# Patient Record
Sex: MALE | NOT HISPANIC OR LATINO | Employment: FULL TIME | URBAN - NONMETROPOLITAN AREA
[De-identification: names, ages, dates, MRNs, and addresses within clinical notes are randomized per-mention and may not be internally consistent; named-entity substitution may affect disease eponyms.]

---

## 2023-12-14 ENCOUNTER — TELEPHONE (OUTPATIENT)
Dept: CARDIOLOGY | Facility: HOSPITAL | Age: 58
End: 2023-12-14
Payer: COMMERCIAL

## 2023-12-14 DIAGNOSIS — D64.9 ANEMIA, UNSPECIFIED TYPE: ICD-10-CM

## 2023-12-14 DIAGNOSIS — E78.5 DYSLIPIDEMIA: ICD-10-CM

## 2023-12-14 DIAGNOSIS — R06.02 SHORTNESS OF BREATH: Primary | ICD-10-CM

## 2023-12-14 DIAGNOSIS — I25.85 CHRONIC CORONARY MICROVASCULAR DYSFUNCTION: ICD-10-CM

## 2023-12-14 DIAGNOSIS — I27.20 PULMONARY HYPERTENSION (MULTI): ICD-10-CM

## 2023-12-21 DIAGNOSIS — R42 DIZZINESS: ICD-10-CM

## 2023-12-21 DIAGNOSIS — I50.9 CONGESTIVE HEART FAILURE, UNSPECIFIED HF CHRONICITY, UNSPECIFIED HEART FAILURE TYPE (MULTI): ICD-10-CM

## 2023-12-21 DIAGNOSIS — Z00.00 HEALTHCARE MAINTENANCE: Primary | ICD-10-CM

## 2023-12-21 DIAGNOSIS — R06.02 SHORTNESS OF BREATH: Primary | ICD-10-CM

## 2023-12-21 DIAGNOSIS — R55 SYNCOPE, UNSPECIFIED SYNCOPE TYPE: ICD-10-CM

## 2023-12-22 ENCOUNTER — HOSPITAL ENCOUNTER (OUTPATIENT)
Dept: RESPIRATORY THERAPY | Facility: HOSPITAL | Age: 58
Discharge: HOME | End: 2023-12-22
Payer: COMMERCIAL

## 2023-12-22 ENCOUNTER — HOSPITAL ENCOUNTER (OUTPATIENT)
Dept: CARDIOLOGY | Facility: HOSPITAL | Age: 58
Discharge: HOME | End: 2023-12-22
Payer: COMMERCIAL

## 2023-12-22 ENCOUNTER — LAB (OUTPATIENT)
Dept: LAB | Facility: LAB | Age: 58
End: 2023-12-22
Payer: COMMERCIAL

## 2023-12-22 ENCOUNTER — OFFICE VISIT (OUTPATIENT)
Dept: CARDIOLOGY | Facility: HOSPITAL | Age: 58
End: 2023-12-22
Payer: COMMERCIAL

## 2023-12-22 ENCOUNTER — HOSPITAL ENCOUNTER (OUTPATIENT)
Dept: RADIOLOGY | Facility: HOSPITAL | Age: 58
Discharge: HOME | End: 2023-12-22
Payer: COMMERCIAL

## 2023-12-22 DIAGNOSIS — I27.20 PULMONARY HYPERTENSION (MULTI): ICD-10-CM

## 2023-12-22 DIAGNOSIS — E78.5 DYSLIPIDEMIA: ICD-10-CM

## 2023-12-22 DIAGNOSIS — R06.02 SHORTNESS OF BREATH: ICD-10-CM

## 2023-12-22 DIAGNOSIS — D64.9 ANEMIA, UNSPECIFIED TYPE: ICD-10-CM

## 2023-12-22 DIAGNOSIS — Z00.00 HEALTHCARE MAINTENANCE: ICD-10-CM

## 2023-12-22 DIAGNOSIS — I10 PRIMARY HYPERTENSION: ICD-10-CM

## 2023-12-22 DIAGNOSIS — I25.85 CHRONIC CORONARY MICROVASCULAR DYSFUNCTION: ICD-10-CM

## 2023-12-22 DIAGNOSIS — M89.8X9 BONE PAIN: Primary | ICD-10-CM

## 2023-12-22 DIAGNOSIS — E11.9 TYPE 2 DIABETES MELLITUS (MULTI): Primary | ICD-10-CM

## 2023-12-22 DIAGNOSIS — M89.8X9 BONE PAIN: ICD-10-CM

## 2023-12-22 DIAGNOSIS — E11.9 TYPE 2 DIABETES MELLITUS (MULTI): ICD-10-CM

## 2023-12-22 DIAGNOSIS — Z98.61 S/P PTCA (PERCUTANEOUS TRANSLUMINAL CORONARY ANGIOPLASTY): ICD-10-CM

## 2023-12-22 DIAGNOSIS — I25.2 HISTORY OF MYOCARDIAL INFARCTION: Primary | Chronic | ICD-10-CM

## 2023-12-22 DIAGNOSIS — R06.02 SOB (SHORTNESS OF BREATH): ICD-10-CM

## 2023-12-22 PROBLEM — I27.24 CTEPH (CHRONIC THROMBOEMBOLIC PULMONARY HYPERTENSION) (MULTI): Status: ACTIVE | Noted: 2023-12-22

## 2023-12-22 PROBLEM — M10.9 GOUT: Status: ACTIVE | Noted: 2023-12-22

## 2023-12-22 PROBLEM — Z86.711 HISTORY OF PULMONARY EMBOLISM: Status: ACTIVE | Noted: 2023-12-22

## 2023-12-22 PROBLEM — R94.39 ABNORMAL STRESS TEST: Status: ACTIVE | Noted: 2023-12-22

## 2023-12-22 PROBLEM — E03.9 HYPOTHYROIDISM: Status: ACTIVE | Noted: 2023-12-22

## 2023-12-22 PROBLEM — I25.10 CAD (CORONARY ARTERY DISEASE): Status: ACTIVE | Noted: 2023-12-22

## 2023-12-22 PROBLEM — E66.9 OBESITY: Status: ACTIVE | Noted: 2023-12-22

## 2023-12-22 LAB
25(OH)D3 SERPL-MCNC: 6 NG/ML (ref 30–100)
ALBUMIN SERPL BCP-MCNC: 4 G/DL (ref 3.4–5)
ALP SERPL-CCNC: 85 U/L (ref 33–120)
ALT SERPL W P-5'-P-CCNC: 10 U/L (ref 10–52)
ANION GAP SERPL CALC-SCNC: 14 MMOL/L (ref 10–20)
AORTIC VALVE PEAK VELOCITY: 1.37
AST SERPL W P-5'-P-CCNC: 17 U/L (ref 9–39)
AV PEAK GRADIENT: 7.5
AVA (PEAK VEL): 2.99
BASOPHILS # BLD AUTO: 0.09 X10*3/UL (ref 0–0.1)
BASOPHILS NFR BLD AUTO: 1.5 %
BILIRUB SERPL-MCNC: 0.5 MG/DL (ref 0–1.2)
BUN SERPL-MCNC: 10 MG/DL (ref 6–23)
C PEPTIDE SERPL-MCNC: 2.8 NG/ML (ref 0.7–3.9)
CALCIUM SERPL-MCNC: 8.7 MG/DL (ref 8.6–10.6)
CHLORIDE SERPL-SCNC: 109 MMOL/L (ref 98–107)
CHOLEST SERPL-MCNC: 115 MG/DL (ref 0–199)
CHOLESTEROL/HDL RATIO: 2.9
CO2 SERPL-SCNC: 22 MMOL/L (ref 21–32)
CREAT SERPL-MCNC: 1.33 MG/DL (ref 0.5–1.3)
EJECTION FRACTION APICAL 4 CHAMBER: 62.7
EJECTION FRACTION: 64
EOSINOPHIL # BLD AUTO: 0.44 X10*3/UL (ref 0–0.7)
EOSINOPHIL NFR BLD AUTO: 7.3 %
ERYTHROCYTE [DISTWIDTH] IN BLOOD BY AUTOMATED COUNT: 20.3 % (ref 11.5–14.5)
FERRITIN SERPL-MCNC: 11 NG/ML (ref 20–300)
GFR SERPL CREATININE-BSD FRML MDRD: 62 ML/MIN/1.73M*2
GLUCOSE SERPL-MCNC: 97 MG/DL (ref 74–99)
HCT VFR BLD AUTO: 33.1 % (ref 41–52)
HDLC SERPL-MCNC: 40.2 MG/DL
HGB BLD-MCNC: 9.5 G/DL (ref 13.5–17.5)
IMM GRANULOCYTES # BLD AUTO: 0.01 X10*3/UL (ref 0–0.7)
IMM GRANULOCYTES NFR BLD AUTO: 0.2 % (ref 0–0.9)
IRON SATN MFR SERPL: 5 % (ref 25–45)
IRON SERPL-MCNC: 20 UG/DL (ref 35–150)
LDLC SERPL CALC-MCNC: 52 MG/DL
LEFT ATRIUM VOLUME AREA LENGTH INDEX BSA: 21.9
LEFT VENTRICLE INTERNAL DIMENSION DIASTOLE: 5.29 (ref 3.5–6)
LEFT VENTRICULAR OUTFLOW TRACT DIAMETER: 2.09
LYMPHOCYTES # BLD AUTO: 1.52 X10*3/UL (ref 1.2–4.8)
LYMPHOCYTES NFR BLD AUTO: 25.3 %
MAGNESIUM SERPL-MCNC: 2.02 MG/DL (ref 1.6–2.4)
MCH RBC QN AUTO: 19.7 PG (ref 26–34)
MCHC RBC AUTO-ENTMCNC: 28.7 G/DL (ref 32–36)
MCV RBC AUTO: 69 FL (ref 80–100)
MITRAL VALVE E/A RATIO: 1.22
MONOCYTES # BLD AUTO: 0.39 X10*3/UL (ref 0.1–1)
MONOCYTES NFR BLD AUTO: 6.5 %
NEUTROPHILS # BLD AUTO: 3.55 X10*3/UL (ref 1.2–7.7)
NEUTROPHILS NFR BLD AUTO: 59.2 %
NON HDL CHOLESTEROL: 75 MG/DL (ref 0–149)
NRBC BLD-RTO: 0 /100 WBCS (ref 0–0)
PLATELET # BLD AUTO: 242 X10*3/UL (ref 150–450)
POTASSIUM SERPL-SCNC: 4.5 MMOL/L (ref 3.5–5.3)
PROT SERPL-MCNC: 6.5 G/DL (ref 6.4–8.2)
PSA SERPL-MCNC: 0.56 NG/ML
PTH-INTACT SERPL-MCNC: 171.7 PG/ML (ref 18.5–88)
RBC # BLD AUTO: 4.82 X10*6/UL (ref 4.5–5.9)
RBC MORPH BLD: NORMAL
RIGHT VENTRICLE FREE WALL PEAK S': 16
SODIUM SERPL-SCNC: 140 MMOL/L (ref 136–145)
TIBC SERPL-MCNC: 431 UG/DL (ref 240–445)
TRIGL SERPL-MCNC: 114 MG/DL (ref 0–149)
TSH SERPL-ACNC: 1.49 MIU/L (ref 0.44–3.98)
UIBC SERPL-MCNC: 411 UG/DL (ref 110–370)
VLDL: 23 MG/DL (ref 0–40)
WBC # BLD AUTO: 6 X10*3/UL (ref 4.4–11.3)

## 2023-12-22 PROCEDURE — 83970 ASSAY OF PARATHORMONE: CPT

## 2023-12-22 PROCEDURE — 80053 COMPREHEN METABOLIC PANEL: CPT

## 2023-12-22 PROCEDURE — 83550 IRON BINDING TEST: CPT

## 2023-12-22 PROCEDURE — 94726 PLETHYSMOGRAPHY LUNG VOLUMES: CPT | Performed by: STUDENT IN AN ORGANIZED HEALTH CARE EDUCATION/TRAINING PROGRAM

## 2023-12-22 PROCEDURE — 36415 COLL VENOUS BLD VENIPUNCTURE: CPT

## 2023-12-22 PROCEDURE — 93306 TTE W/DOPPLER COMPLETE: CPT

## 2023-12-22 PROCEDURE — 93010 ELECTROCARDIOGRAM REPORT: CPT | Performed by: STUDENT IN AN ORGANIZED HEALTH CARE EDUCATION/TRAINING PROGRAM

## 2023-12-22 PROCEDURE — 93306 TTE W/DOPPLER COMPLETE: CPT | Performed by: INTERNAL MEDICINE

## 2023-12-22 PROCEDURE — 93005 ELECTROCARDIOGRAM TRACING: CPT

## 2023-12-22 PROCEDURE — 83735 ASSAY OF MAGNESIUM: CPT

## 2023-12-22 PROCEDURE — 1036F TOBACCO NON-USER: CPT | Performed by: INTERNAL MEDICINE

## 2023-12-22 PROCEDURE — 84443 ASSAY THYROID STIM HORMONE: CPT

## 2023-12-22 PROCEDURE — 84681 ASSAY OF C-PEPTIDE: CPT

## 2023-12-22 PROCEDURE — 94729 DIFFUSING CAPACITY: CPT | Performed by: STUDENT IN AN ORGANIZED HEALTH CARE EDUCATION/TRAINING PROGRAM

## 2023-12-22 PROCEDURE — 94010 BREATHING CAPACITY TEST: CPT | Performed by: STUDENT IN AN ORGANIZED HEALTH CARE EDUCATION/TRAINING PROGRAM

## 2023-12-22 PROCEDURE — 84153 ASSAY OF PSA TOTAL: CPT

## 2023-12-22 PROCEDURE — 94618 PULMONARY STRESS TESTING: CPT | Performed by: STUDENT IN AN ORGANIZED HEALTH CARE EDUCATION/TRAINING PROGRAM

## 2023-12-22 PROCEDURE — 82172 ASSAY OF APOLIPOPROTEIN: CPT

## 2023-12-22 PROCEDURE — 83540 ASSAY OF IRON: CPT

## 2023-12-22 PROCEDURE — 99215 OFFICE O/P EST HI 40 MIN: CPT | Performed by: INTERNAL MEDICINE

## 2023-12-22 PROCEDURE — 82728 ASSAY OF FERRITIN: CPT

## 2023-12-22 PROCEDURE — 82306 VITAMIN D 25 HYDROXY: CPT

## 2023-12-22 PROCEDURE — 99215 OFFICE O/P EST HI 40 MIN: CPT | Mod: 25 | Performed by: INTERNAL MEDICINE

## 2023-12-22 PROCEDURE — 80061 LIPID PANEL: CPT

## 2023-12-22 PROCEDURE — 85025 COMPLETE CBC W/AUTO DIFF WBC: CPT

## 2023-12-22 PROCEDURE — 71046 X-RAY EXAM CHEST 2 VIEWS: CPT | Performed by: RADIOLOGY

## 2023-12-22 PROCEDURE — 71046 X-RAY EXAM CHEST 2 VIEWS: CPT

## 2023-12-22 RX ORDER — ALLOPURINOL 100 MG/1
TABLET ORAL
COMMUNITY
Start: 2016-06-14

## 2023-12-22 RX ORDER — IBUPROFEN 800 MG/1
TABLET ORAL
COMMUNITY
Start: 2023-05-01 | End: 2023-12-24 | Stop reason: ALTCHOICE

## 2023-12-22 RX ORDER — COLCHICINE 0.6 MG/1
TABLET ORAL
COMMUNITY
Start: 2018-06-25

## 2023-12-22 RX ORDER — AMOXICILLIN 500 MG/1
500 TABLET, FILM COATED ORAL 3 TIMES DAILY
COMMUNITY
Start: 2023-05-01

## 2023-12-22 RX ORDER — ASPIRIN 81 MG/1
TABLET ORAL
COMMUNITY
Start: 2016-06-14

## 2023-12-22 RX ORDER — IRON POLYSACCHARIDE COMPLEX 150 MG
1 CAPSULE ORAL 2 TIMES DAILY
COMMUNITY
Start: 2022-01-22 | End: 2023-12-25 | Stop reason: SDUPTHER

## 2023-12-22 RX ORDER — LEVOTHYROXINE SODIUM 25 UG/1
1 TABLET ORAL DAILY
COMMUNITY
Start: 2016-06-14

## 2023-12-22 RX ORDER — EZETIMIBE 10 MG/1
TABLET ORAL
COMMUNITY
Start: 2016-06-14

## 2023-12-22 RX ORDER — METOPROLOL SUCCINATE 50 MG/1
TABLET, EXTENDED RELEASE ORAL
COMMUNITY
Start: 2016-06-14

## 2023-12-22 RX ORDER — ATORVASTATIN CALCIUM 80 MG/1
TABLET, FILM COATED ORAL
COMMUNITY
Start: 2016-06-14

## 2023-12-22 RX ORDER — TADALAFIL 20 MG/1
2 TABLET ORAL DAILY
COMMUNITY
Start: 2016-06-14

## 2023-12-22 NOTE — PROGRESS NOTES
DIAGNOSES: Pulmonary arterial hypertension. Symptomatically improving with anticoagulation therapy, H/o PE. Mildly dilated right ventricle with mild RV systolic dysfunction (2012) -> Normal RV sized and function (12/22/2023). 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 Preserved LV systolic function. (Cath 9/13/2012) NYHA class II. sinus rhythm. Euvolemic. Hypertension. Dyslipidemia. Type 2 Diabetes Mellitus (LhP7m=3.4 on 12/22/2023). Hypothyroidism on replacement therapy. Fe deficiency anemia.     Mr Hinton has come today for routine follow up. He continues to remain active on current medications, including DOAC and PDE5i.   [place] and hence he requires medications for 6 months supply. He was found to be anemic. He has not had routine colonoscopy done.      12 system review was otherwise negative.     His current medications include Metoprolol ER 25 mg daily, Atorvastatin 80 mg daily, Ezetimibe 10 mg daily, Allopurinol 100 mg daily, Rivaroxaban 20 mg daily, Tadalafil (ADCIRCA) 40 mg daily and Levothyroxine.        Active Problems  Problems    · CAD (coronary artery disease) (414.00) (I25.10)   · Chronic idiopathic gout of ankle, unspecified laterality (274.02) (M1A.0790)   · CTEPH (chronic thromboembolic pulmonary hypertension) (416.8,415.19) (I27.24)   · Dyslipidemia (272.4) (E78.5)   · Gout (274.9) (M10.9)   · Hypertension (401.9) (I10)   · Added by Problem List Migration; 2013-7-26; Moved to [facility] Nov 29 2013  9:08PM   · Hypothyroidism (244.9) (E03.9)   · Old anterior myocardial infarction (412) (I25.2)   · Pulmonary hypertension (416.8) (I27.20)   · S/P PTCA (percutaneous transluminal coronary angioplasty) (V45.82) (Z98.61)   · Shortness of breath (786.05)  (R06.02)     Past Medical History  Problems    · History of pulmonary embolism (V12.55) (Z86.711)   · Old anterior myocardial infarction (412) (I25.2)     Current Meds     Medication Name Instruction   Allopurinol 100 MG Oral Tablet TAKE 1 TABLET DAILY AS DIRECTED.   Aspirin EC Low Dose 81 MG Oral Tablet Delayed Release TAKE 1 TABLET DAILY.   Atorvastatin Calcium 80 MG Oral Tablet TAKE 1 TABLET DAILY.   Colchicine 0.6 MG Oral Tablet TAKE 1 TABLET DAILY AS DIRECTED.   Ezetimibe 10 MG Oral Tablet TAKE 1 TABLET AT BEDTIME.   Levothyroxine Sodium 25 MCG Oral Tablet TAKE 1 TABLET DAILY AS DIRECTED.   Metoprolol Succinate ER 50 MG Oral Tablet Extended Release 24 Hour TAKE 1 TABLET ONCE DAILY.   Tadalafil (PAH) 20 MG Oral Tablet TAKE 2 TABLET Daily   Xarelto 20 MG Oral Tablet Take 1 tablet daily      Allergies  Medication    · No Known Drug Allergies  Recorded By: Jarrett Ayala; 6/14/2016 10:30:06 AM     Family History  Father    · Family history of coronary artery disease (V17.3) (Z82.49)     Social History  Problems    · Never a smoker     Vitals  Vital Signs     Recorded: [id number] 11:21AM   Heart Rate 69   Respiration 17   Systolic 126   Diastolic 68   Height 5 ft 5 in   Weight 180 lb 12.42 oz   BMI Calculated 30.08 kg/m2              Physical Exam     On Examination he was comfortably sitting. HEENT: Normal. No pallor. Neck: Supple. Carotids brisk upstroke. Jugular venous pressure was normal. Cardiovascular system examination was essentially unremarkable with a normal first and second heart sound without any significant murmurs or rubs. Chest was clear to auscultation. Abdomen was soft, nontender. No organomegaly. Extremities were without any edema. Peripheral pulses being normally felt. There was no calf tenderness. CNS: HMF normal. No focal deficits.        Results/Data        All available data was personally reviewed by me.     ECHO 1/21/2022  Preserved LV/RV systolic function.     6MWT 1/21/2022  435 M. No drop  12-Oct-2019 00:00 in O2 sat     CATH 9/13/2012  Baseline Pre PCI LVEF  SVC MEAN  6 (mmHg)    RA MEAN  6 (mmHg)    RA a wave  11 (mmHg)    RA v wave  9 (mmHg)    RV SYSTOLIC  77 (mmHg)    RVEDIAS  10 (mmHg)    PA SYSTOLIC  77 (mmHg)    PA DIASTOLIC  25 (mmHg)    PA MEAN  43 (mmHg)    PCW MEAN  6 (mmHg)    PCW a wave  11 (mmHg)    PCW v wave  7 (mmHg)    AORTIC SYSTOLIC  136 (mmHg)    AORTIC DIASTOLIC  94 (mmHg)    AORTIC MEAN  108 (mmHg)       Baseline Basic  Body Surface Area  1.94 m2       Baseline Resistances  SVR_DSC  1871 (dsc-5)    PVR_DSC  635 (dsc-5)       Baseline Oximetry  Right Heart  Main PA  66%     Left Heart  Femoral Artery  96%        Baseline Cardiac Output  Thermal Cardiac Output 4.53 (L/min)    Thermal Cardiac Index 2.34 (L/min/m2)    Spencer Cardiac Output 4.02 (L/min)    Spencer Cardiac Index  2.07 (L/min/m2)       Phase 1 Oximetry  Right Heart  Main PA  66%     Left Heart  Femoral Artery  96%     Angiographic Comments :  Selective RPA angio in AP view showed abrupt tapering of RUL PA branch,  somewhat truncated RML branch, with paucity of vascularity in the middle  lobe distribution and constriction of lower lobe branches.Selective LPA  angios in 15 degree and 30 degree Arabic views showed rather smooth  arborization of branches, though with mild paucity of vascularity in the  middle region.The levo phase of the angios showed well filled normal  sized left ventricle with normal contractility and overall EF of  approximately 55%.      Equipment:   {Contrast } Omnipaque 350 100 ml   {Sheaths } 12cm 7 Malay   {Coronary Cath } 7 Malay [facility]   {Coronary Cath } 6 Malay Pigtail Angled   {Sheaths } 12cm 4 Malay Micropuncture Kit   {Diagnostic Wires } .025 mm x 260 cm J wire movable Core     Complications:  * The patient tolerated the procedure with no complications.     Conclusions:  * PAH for evaluation.  * Normal right and left heart filling pressures  * Moderately severe PAH with elevated transpulmonary  gradient and PVR.  * Angiographically: Major obstructive lesions, mainly involving RPA  branches more centrally.  *  Minor obstructive lesions in LPA branches  * Preserved LV systolic function (EF 50-55%).   * (Coronary angio done on 6/7/2011 showed a right dominant system with  an anomalous origin of LCx from RCA and a widely patent stent in  proximal LAD, without any obstructive lesions)     Recommendations :  * Elective pulmonary thromboendarterectomy. Continue optimal medical  management including oral anticoagulation. Patient is referred to Dr Ange Cohen, [facility] for the  procedure.     As the Attending physician, I was present throughout the entire  procedure and performed or directly supervised all manipulations during  the procedure.     ________________________________________________________________  Electronically signed at 09/13/2012 18:22:45 by: Jarrett Ayala MD       12/22/2023  Hb 9.5   Iron 20  UIBC 411  TIBC 431    Creatinine 1.33  BUN 10  HbA1c 6.4  Cholesterol 115  LDL 52  HDL 40.2    TSH 1.49  Vit D 6  PTH Intact 171.7  C Peptide 2.8      12/22/2023  ECHO   CONCLUSIONS:   1. Left ventricular systolic function is normal with a 60-65% estimated ejection fraction.   2. The right ventricle is normal in size. There is normal right ventricular global systolic function.   3. There is trace tricuspid regurgitation. The right ventricular systolic pressure is unable to be estimated.   4. Normal aortic root.    Thomas Blum MD  Electronically signed on 12/22/2023 at 9:40:43 AM    PFT WNL  6  meters (improved from 1/22/2022)             Impressions     DIAGNOSES: Pulmonary arterial hypertension. Symptomatically improving with anticoagulation therapy, H/o PE. Mildly dilated right ventricle with mild RV systolic dysfunction (2012) -> Normal RV sized and function (12/22/2023). 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 Preserved LV systolic function. (Cath 9/13/2012) NYHA class II. sinus rhythm. Euvolemic. Hypertension. Dyslipidemia. Type 2 Diabetes Mellitus (LvH3n=2.4 on 12/22/2023). Hypothyroidism on replacement therapy. Fe deficiency anemia.     I am pleased to see that Artur has been doing well symptomatically, on his current medications. However, he is very likely to deteriorate symptomatically, if he discontinues PDE5 inhibitor, Tadalafi (ADCIRCA). We discussed about his gradually increasing hemoglobin A1c as well as mild obesity. I encouraged him to continue positive lifestyle changes and dietary modifications to lose weight. I also encouraged him to take plenty of oral fluids, especially because his creatinine has been 1.33     I have added iron supplements and vitamin D.  I also recommended an elective gastroenterology evaluation for upper and lower GI scopy, in a timely fashion.       I encouraged him to continue with activities as tolerated, along with medications as prescribed including Tadalafil (Adcirca) and Rivaroxaban (Xarelto). We will continue to follow him with periodically, with a view to intervene in case of any worsening symptoms, PA pressures or RV function. He knows to get in touch with us, in case of any worsening symptoms.        CC: Moy Cohen M.D.   [facility].

## 2023-12-22 NOTE — Clinical Note
December 25, 2023       No Recipients    Patient: Artur Hinton   YOB: 1965   Date of Visit: 12/22/2023       Dear Dr. Hoyt Recipients:    Thank you for referring Artur Hinton to me for evaluation. Below are my notes for this consultation.  If you have questions, please do not hesitate to call me. I look forward to following your patient along with you.       Sincerely,     Jarrett Ayala MD      CC:   No Recipients  ______________________________________________________________________________________           DIAGNOSES: Pulmonary arterial hypertension. Symptomatically improving with anticoagulation therapy, H/o PE. Mildly dilated right ventricle with mild RV systolic dysfunction (2012) -> Normal RV sized and function (12/22/2023). 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 Preserved LV systolic function. (Cath 9/13/2012) NYHA class II. sinus rhythm. Euvolemic. Hypertension. Dyslipidemia. Type 2 Diabetes Mellitus (NrA5q=1.4 on 12/22/2023). Hypothyroidism on replacement therapy. Fe deficiency anemia.     Mr Hinton has come today for routine follow up. He continues to remain active on current medications, including DOAC and PDE5i.   [place] and hence he requires medications for 6 months supply. He was found to be anemic. He has not had routine colonoscopy done.      12 system review was otherwise negative.     His current medications include Metoprolol ER 25 mg daily, Atorvastatin 80 mg daily, Ezetimibe 10 mg daily, Allopurinol 100 mg daily, Rivaroxaban 20 mg daily, Tadalafil (ADCIRCA) 40 mg daily and Levothyroxine.        Active Problems  Problems    · CAD (coronary artery disease) (414.00) (I25.10)   · Chronic idiopathic gout of ankle,  unspecified laterality (274.02) (M1A.0790)   · CTEPH (chronic thromboembolic pulmonary hypertension) (416.8,415.19) (I27.24)   · Dyslipidemia (272.4) (E78.5)   · Gout (274.9) (M10.9)   · Hypertension (401.9) (I10)   · Added by Problem List Migration; 2013-7-26; Moved to [place] Nov 29 2013  9:08PM   · Hypothyroidism (244.9) (E03.9)   · Old anterior myocardial infarction (412) (I25.2)   · Pulmonary hypertension (416.8) (I27.20)   · S/P PTCA (percutaneous transluminal coronary angioplasty) (V45.82) (Z98.61)   · Shortness of breath (786.05) (R06.02)     Past Medical History  Problems    · History of pulmonary embolism (V12.55) (Z86.711)   · Old anterior myocardial infarction (412) (I25.2)     Current Meds     Medication Name Instruction   Allopurinol 100 MG Oral Tablet TAKE 1 TABLET DAILY AS DIRECTED.   Aspirin EC Low Dose 81 MG Oral Tablet Delayed Release TAKE 1 TABLET DAILY.   Atorvastatin Calcium 80 MG Oral Tablet TAKE 1 TABLET DAILY.   Colchicine 0.6 MG Oral Tablet TAKE 1 TABLET DAILY AS DIRECTED.   Ezetimibe 10 MG Oral Tablet TAKE 1 TABLET AT BEDTIME.   Levothyroxine Sodium 25 MCG Oral Tablet TAKE 1 TABLET DAILY AS DIRECTED.   Metoprolol Succinate ER 50 MG Oral Tablet Extended Release 24 Hour TAKE 1 TABLET ONCE DAILY.   Tadalafil (PAH) 20 MG Oral Tablet TAKE 2 TABLET Daily   Xarelto 20 MG Oral Tablet Take 1 tablet daily      Allergies  Medication    · No Known Drug Allergies  Recorded By: Jarrett Ayala; 6/14/2016 10:30:06 AM     Family History  Father    · Family history of coronary artery disease (V17.3) (Z82.49)     Social History  Problems    · Never a smoker     Vitals  Vital Signs     Recorded: [id number] 11:21AM   Heart Rate 69   Respiration 17   Systolic 126   Diastolic 68   Height 5 ft 5 in   Weight 180 lb 12.42 oz   BMI Calculated 30.08 kg/m2                  Physical Exam     On Examination he was comfortably sitting. HEENT: Normal. No pallor. Neck: Supple. Carotids brisk upstroke. Jugular venous  pressure was normal. Cardiovascular system examination was essentially unremarkable with a normal first and second heart sound without any significant murmurs or rubs. Chest was clear to auscultation. Abdomen was soft, nontender. No organomegaly. Extremities were without any edema. Peripheral pulses being normally felt. There was no calf tenderness. CNS: HMF normal. No focal deficits.        Results/Data        All available data was personally reviewed by me.     ECHO 1/21/2022  Preserved LV/RV systolic function.     6MWT 1/21/2022  435 M. No drop in O2 sat     CATH 9/13/2012  Baseline Pre PCI LVEF  SVC MEAN  6 (mmHg)    RA MEAN  6 (mmHg)    RA a wave  11 (mmHg)    RA v wave  9 (mmHg)    RV SYSTOLIC  77 (mmHg)    RVEDIAS  10 (mmHg)    PA SYSTOLIC  77 (mmHg)    PA DIASTOLIC  25 (mmHg)    PA MEAN  43 (mmHg)    PCW MEAN  6 (mmHg)    PCW a wave  11 (mmHg)    PCW v wave  7 (mmHg)    AORTIC SYSTOLIC  136 (mmHg)    AORTIC DIASTOLIC  94 (mmHg)    AORTIC MEAN  108 (mmHg)       Baseline Basic  Body Surface Area  1.94 m2       Baseline Resistances  SVR_DSC  1871 (dsc-5)    PVR_DSC  635 (dsc-5)       Baseline Oximetry  Right Heart  Main PA  66%     Left Heart  Femoral Artery  96%        Baseline Cardiac Output  Thermal Cardiac Output 4.53 (L/min)    Thermal Cardiac Index 2.34 (L/min/m2)    Spencer Cardiac Output 4.02 (L/min)    Spencer Cardiac Index  2.07 (L/min/m2)       Phase 1 Oximetry  Right Heart  Main PA  66%     Left Heart  Femoral Artery  96%     Angiographic Comments :  Selective RPA angio in AP view showed abrupt tapering of RUL PA branch,  somewhat truncated RML branch, with paucity of vascularity in the middle  lobe distribution and constriction of lower lobe branches.Selective LPA  angios in 15 degree and 30 degree [place] citizen views showed rather smooth  arborization of branches, though with mild paucity of vascularity in the  middle region.The levo phase of the angios showed well filled normal  sized left ventricle with normal  contractility and overall EF of  approximately 55%.      Equipment:   {Contrast } Omnipaque 350 100 ml   {Sheaths } 12cm 7 [place]   {Coronary Cath } 7 [place] [facility]   {Coronary Cath } 6 [place] Pigtail Angled   {Sheaths } 12cm 4 [place] Micropuncture Kit   {Diagnostic Wires } .025 mm x 260 cm J wire movable Core     Complications:  * The patient tolerated the procedure with no complications.     Conclusions:  * PAH for evaluation.  * Normal right and left heart filling pressures  * Moderately severe PAH with elevated transpulmonary gradient and PVR.  * Angiographically: Major obstructive lesions, mainly involving RPA  branches more centrally.  *  Minor obstructive lesions in LPA branches  * Preserved LV systolic function (EF 50-55%).   * (Coronary angio done on 6/7/2011 showed a right dominant system with  an anomalous origin of LCx from RCA and a widely patent stent in  proximal LAD, without any obstructive lesions)     Recommendations :  * Elective pulmonary thromboendarterectomy. Continue optimal medical  management including oral anticoagulation. Patient is referred to Dr Ange Cohen, [facility] for the  procedure.     As the Attending physician, I was present throughout the entire  procedure and performed or directly supervised all manipulations during  the procedure.     ________________________________________________________________  Electronically signed at 09/13/2012 18:22:45 by: Jarrett Ayala MD         12/22/2023  Hb 9.5   Iron 20  UIBC 411  TIBC 431    Creatinine 1.33  BUN 10  HbA1c 6.4  Cholesterol 115  LDL 52  HDL 40.2    TSH 1.49  Vit D 6  PTH Intact 171.7  C Peptide 2.8        12/22/2023  ECHO   CONCLUSIONS:   1. Left ventricular systolic function is normal with a 60-65% estimated ejection fraction.   2. The right ventricle is normal in size. There is normal right ventricular global systolic function.   3. There is trace tricuspid  regurgitation. The right ventricular systolic pressure is unable to be estimated.   4. Normal aortic root.     Thomas Blum MD  Electronically signed on 12/22/2023 at 9:40:43 AM     PFT WNL  6  meters (improved from 1/22/2022)                 Impressions     DIAGNOSES: Pulmonary arterial hypertension. Symptomatically improving with anticoagulation therapy, H/o PE. Mildly dilated right ventricle with mild RV systolic dysfunction (2012) -> Normal RV sized and function (12/22/2023). 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 Preserved LV systolic function. (Cath 9/13/2012) NYHA class II. sinus rhythm. Euvolemic. Hypertension. Dyslipidemia. Type 2 Diabetes Mellitus (FpC8u=6.4 on 12/22/2023). Hypothyroidism on replacement therapy. Fe deficiency anemia.     I am pleased to see that Artur has been doing well symptomatically, on his current medications. However, he is very likely to deteriorate symptomatically, if he discontinues PDE5 inhibitor, Tadalafi (ADCIRCA). We discussed about his gradually increasing hemoglobin A1c as well as mild obesity. I encouraged him to continue positive lifestyle changes and dietary modifications to lose weight. I also encouraged him to take plenty of oral fluids, especially because his creatinine has been 1.33     I have added iron supplements and vitamin D.  I also recommended an elective gastroenterology evaluation for upper and lower GI scopy, in a timely fashion.        I encouraged him to continue with activities as tolerated, along with medications as prescribed including Tadalafil (Adcirca) and Rivaroxaban (Xarelto). We will continue to follow him with periodically, with a view to intervene in case of any worsening symptoms, PA pressures or RV  function. He knows to get in touch with us, in case of any worsening symptoms.        CC: Moy Cohen M.D.   [facility].

## 2023-12-24 DIAGNOSIS — E55.9 VITAMIN D DEFICIENCY: Primary | ICD-10-CM

## 2023-12-24 LAB — LPA SERPL-MCNC: 34 MG/DL

## 2023-12-24 RX ORDER — CEPHRADINE 500 MG
50000 CAPSULE ORAL
Qty: 20 CAPSULE | Refills: 1 | Status: SHIPPED | OUTPATIENT
Start: 2023-12-24 | End: 2023-12-27 | Stop reason: ALTCHOICE

## 2023-12-25 DIAGNOSIS — D50.9 IRON DEFICIENCY ANEMIA, UNSPECIFIED IRON DEFICIENCY ANEMIA TYPE: Primary | ICD-10-CM

## 2023-12-25 RX ORDER — IRON POLYSACCHARIDE COMPLEX 150 MG
150 CAPSULE ORAL 2 TIMES DAILY
Qty: 180 CAPSULE | Refills: 3 | Status: SHIPPED | OUTPATIENT
Start: 2023-12-25 | End: 2023-12-27 | Stop reason: SDUPTHER

## 2023-12-25 NOTE — PROGRESS NOTES
DIAGNOSES: Pulmonary arterial hypertension. Symptomatically improving with anticoagulation therapy, H/o PE. Mildly dilated right ventricle with mild RV systolic dysfunction (2012) -> Normal RV sized and function (12/22/2023). 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 Preserved LV systolic function. (Cath 9/13/2012) NYHA class II. sinus rhythm. Euvolemic. Hypertension. Dyslipidemia. Type 2 Diabetes Mellitus (LzY0m=3.4 on 12/22/2023). Hypothyroidism on replacement therapy. Fe deficiency anemia.     Mr Hinton has come today for routine follow up. He continues to remain active on current medications, including DOAC and PDE5i.   [place] and hence he requires medications for 6 months supply. He was found to be anemic. He has not had routine colonoscopy done.      12 system review was otherwise negative.     His current medications include Metoprolol ER 25 mg daily, Atorvastatin 80 mg daily, Ezetimibe 10 mg daily, Allopurinol 100 mg daily, Rivaroxaban 20 mg daily, Tadalafil (ADCIRCA) 40 mg daily and Levothyroxine.        Active Problems  Problems    · CAD (coronary artery disease) (414.00) (I25.10)   · Chronic idiopathic gout of ankle, unspecified laterality (274.02) (M1A.0790)   · CTEPH (chronic thromboembolic pulmonary hypertension) (416.8,415.19) (I27.24)   · Dyslipidemia (272.4) (E78.5)   · Gout (274.9) (M10.9)   · Hypertension (401.9) (I10)   · Added by Problem List Migration; 2013-7-26; Moved to [facility] Nov 29 2013  9:08PM   · Hypothyroidism (244.9) (E03.9)   · Old anterior myocardial infarction (412) (I25.2)   · Pulmonary hypertension (416.8) (I27.20)   · S/P PTCA (percutaneous transluminal coronary angioplasty) (V45.82) (Z98.61)   · Shortness of breath  (786.05) (R06.02)     Past Medical History  Problems    · History of pulmonary embolism (V12.55) (Z86.711)   · Old anterior myocardial infarction (412) (I25.2)     Current Meds     Medication Name Instruction   Allopurinol 100 MG Oral Tablet TAKE 1 TABLET DAILY AS DIRECTED.   Aspirin EC Low Dose 81 MG Oral Tablet Delayed Release TAKE 1 TABLET DAILY.   Atorvastatin Calcium 80 MG Oral Tablet TAKE 1 TABLET DAILY.   Colchicine 0.6 MG Oral Tablet TAKE 1 TABLET DAILY AS DIRECTED.   Ezetimibe 10 MG Oral Tablet TAKE 1 TABLET AT BEDTIME.   Levothyroxine Sodium 25 MCG Oral Tablet TAKE 1 TABLET DAILY AS DIRECTED.   Metoprolol Succinate ER 50 MG Oral Tablet Extended Release 24 Hour TAKE 1 TABLET ONCE DAILY.   Tadalafil (PAH) 20 MG Oral Tablet TAKE 2 TABLET Daily   Xarelto 20 MG Oral Tablet Take 1 tablet daily      Allergies  Medication    · No Known Drug Allergies  Recorded By: Jarrett Ayala; 6/14/2016 10:30:06 AM     Family History  Father    · Family history of coronary artery disease (V17.3) (Z82.49)     Social History  Problems    · Never a smoker     Vitals  Vital Signs     Recorded: [id number] 11:21AM   Heart Rate 69   Respiration 17   Systolic 126   Diastolic 68   Height 5 ft 5 in   Weight 180 lb 12.42 oz   BMI Calculated 30.08 kg/m2                  Physical Exam     On Examination he was comfortably sitting. HEENT: Normal. No pallor. Neck: Supple. Carotids brisk upstroke. Jugular venous pressure was normal. Cardiovascular system examination was essentially unremarkable with a normal first and second heart sound without any significant murmurs or rubs. Chest was clear to auscultation. Abdomen was soft, nontender. No organomegaly. Extremities were without any edema. Peripheral pulses being normally felt. There was no calf tenderness. CNS: HMF normal. No focal deficits.        Results/Data        All available data was personally reviewed by me.     ECHO 1/21/2022  Preserved LV/RV systolic function.     6MWT  1/21/2022  435 M. No drop in O2 sat     CATH 9/13/2012  Baseline Pre PCI LVEF  SVC MEAN  6 (mmHg)    RA MEAN  6 (mmHg)    RA a wave  11 (mmHg)    RA v wave  9 (mmHg)    RV SYSTOLIC  77 (mmHg)    RVEDIAS  10 (mmHg)    PA SYSTOLIC  77 (mmHg)    PA DIASTOLIC  25 (mmHg)    PA MEAN  43 (mmHg)    PCW MEAN  6 (mmHg)    PCW a wave  11 (mmHg)    PCW v wave  7 (mmHg)    AORTIC SYSTOLIC  136 (mmHg)    AORTIC DIASTOLIC  94 (mmHg)    AORTIC MEAN  108 (mmHg)       Baseline Basic  Body Surface Area  1.94 m2       Baseline Resistances  SVR_DSC  1871 (dsc-5)    PVR_DSC  635 (dsc-5)       Baseline Oximetry  Right Heart  Main PA  66%     Left Heart  Femoral Artery  96%        Baseline Cardiac Output  Thermal Cardiac Output 4.53 (L/min)    Thermal Cardiac Index 2.34 (L/min/m2)    Spencer Cardiac Output 4.02 (L/min)    Spencer Cardiac Index  2.07 (L/min/m2)       Phase 1 Oximetry  Right Heart  Main PA  66%     Left Heart  Femoral Artery  96%     Angiographic Comments :  Selective RPA angio in AP view showed abrupt tapering of RUL PA branch,  somewhat truncated RML branch, with paucity of vascularity in the middle  lobe distribution and constriction of lower lobe branches.Selective LPA  angios in 15 degree and 30 degree [place] views showed rather smooth  arborization of branches, though with mild paucity of vascularity in the  middle region.The levo phase of the angios showed well filled normal  sized left ventricle with normal contractility and overall EF of  approximately 55%.    25 mm x 260 cm J wire movable Core     Complications:  * The patient tolerated the procedure with no complications.     Conclusions:  * PAH for evaluation.  * Normal right and left heart filling pressures  * Moderately severe PAH with elevated transpulmonary gradient and PVR.  * Angiographically: Major obstructive lesions, mainly involving RPA  branches more centrally.  *  Minor obstructive lesions in LPA branches  * Preserved LV systolic function (EF 50-55%).   *  (Coronary angio done on 6/7/2011 showed a right dominant system with  an anomalous origin of LCx from RCA and a widely patent stent in  proximal LAD, without any obstructive lesions)     Recommendations :  * Elective pulmonary thromboendarterectomy. Continue optimal medical  management including oral anticoagulation. Patient is referred to Dr Ange Cohen, [facility] [place] [facility] for the  procedure.     As the Attending physician, I was present throughout the entire  procedure and performed or directly supervised all manipulations during  the procedure.     ________________________________________________________________  Electronically signed at 09/13/2012 18:22:45 by: Jarrett Ayala MD         12/22/2023  Hb 9.5   Iron 20  UIBC 411  TIBC 431    Creatinine 1.33  BUN 10  HbA1c 6.4  Cholesterol 115  LDL 52  HDL 40.2    TSH 1.49  Vit D 6  PTH Intact 171.7  C Peptide 2.8        12/22/2023  ECHO   CONCLUSIONS:   1. Left ventricular systolic function is normal with a 60-65% estimated ejection fraction.   2. The right ventricle is normal in size. There is normal right ventricular global systolic function.   3. There is trace tricuspid regurgitation. The right ventricular systolic pressure is unable to be estimated.   4. Normal aortic root.     Thomas Blum MD  Electronically signed on 12/22/2023 at 9:40:43 AM     PFT WNL  6  meters (improved from 1/22/2022)                 Impressions     DIAGNOSES: Pulmonary arterial hypertension. Symptomatically improving with anticoagulation therapy, H/o PE. Mildly dilated right ventricle with mild RV systolic dysfunction (2012) -> Normal RV sized and function (12/22/2023). 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 Preserved LV systolic function. (Cath 9/13/2012) NYHA class II. sinus rhythm. Euvolemic. Hypertension. Dyslipidemia. Type 2 Diabetes Mellitus (VqH3g=6.4 on 12/22/2023). Hypothyroidism on replacement therapy. Fe deficiency anemia.     I am pleased to see that Artur has been doing well symptomatically, on his current medications. However, he is very likely to deteriorate symptomatically, if he discontinues PDE5 inhibitor, Tadalafi (ADCIRCA). We discussed about his gradually increasing hemoglobin A1c as well as mild obesity. I encouraged him to continue positive lifestyle changes and dietary modifications to lose weight. I also encouraged him to take plenty of oral fluids, especially because his creatinine has been 1.33     I have added iron supplements and vitamin D.  I also recommended an elective gastroenterology evaluation for upper and lower GI scopy, in a timely fashion.        I encouraged him to continue with activities as tolerated, along with medications as prescribed including Tadalafil (Adcirca) and Rivaroxaban (Xarelto). We will continue to follow him with periodically, with a view to intervene in case of any worsening symptoms, PA pressures or RV function. He knows to get in touch with us, in case of any worsening symptoms.        CC: Moy Cohen M.D.   [facility].

## 2023-12-25 NOTE — PROGRESS NOTES
DIAGNOSES: Pulmonary arterial hypertension. Symptomatically improving with anticoagulation therapy, H/o PE. Mildly dilated right ventricle with mild RV systolic dysfunction (2012) -> Normal RV sized and function (12/22/2023). 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 Preserved LV systolic function. (Cath 9/13/2012) NYHA class II. sinus rhythm. Euvolemic. Hypertension. Dyslipidemia. Type 2 Diabetes Mellitus (DsU7x=9.4 on 12/22/2023). Hypothyroidism on replacement therapy. Fe deficiency anemia.     Mr Hinton has come today for routine follow up. He continues to remain active on current medications, including DOAC and PDE5i.   [place] and hence he requires medications for 6 months supply. He was found to be anemic. He has not had routine colonoscopy done.      12 system review was otherwise negative.     His current medications include Metoprolol ER 25 mg daily, Atorvastatin 80 mg daily, Ezetimibe 10 mg daily, Allopurinol 100 mg daily, Rivaroxaban 20 mg daily, Tadalafil (ADCIRCA) 40 mg daily and Levothyroxine.        Active Problems  Problems    · CAD (coronary artery disease) (414.00) (I25.10)   · Chronic idiopathic gout of ankle, unspecified laterality (274.02) (M1A.0790)   · CTEPH (chronic thromboembolic pulmonary hypertension) (416.8,415.19) (I27.24)   · Dyslipidemia (272.4) (E78.5)   · Gout (274.9) (M10.9)   · Hypertension (401.9) (I10)   · Added by Problem List Migration; 2013-7-26; Moved to [facility] Nov 29 2013  9:08PM   · Hypothyroidism (244.9) (E03.9)   · Old anterior myocardial infarction (412) (I25.2)   · Pulmonary hypertension (416.8) (I27.20)   · S/P PTCA (percutaneous transluminal coronary angioplasty) (V45.82) (Z98.61)   · Shortness of breath  (786.05) (R06.02)     Past Medical History  Problems    · History of pulmonary embolism (V12.55) (Z86.711)   · Old anterior myocardial infarction (412) (I25.2)     Current Meds     Medication Name Instruction   Allopurinol 100 MG Oral Tablet TAKE 1 TABLET DAILY AS DIRECTED.   Aspirin EC Low Dose 81 MG Oral Tablet Delayed Release TAKE 1 TABLET DAILY.   Atorvastatin Calcium 80 MG Oral Tablet TAKE 1 TABLET DAILY.   Colchicine 0.6 MG Oral Tablet TAKE 1 TABLET DAILY AS DIRECTED.   Ezetimibe 10 MG Oral Tablet TAKE 1 TABLET AT BEDTIME.   Levothyroxine Sodium 25 MCG Oral Tablet TAKE 1 TABLET DAILY AS DIRECTED.   Metoprolol Succinate ER 50 MG Oral Tablet Extended Release 24 Hour TAKE 1 TABLET ONCE DAILY.   Tadalafil (PAH) 20 MG Oral Tablet TAKE 2 TABLET Daily   Xarelto 20 MG Oral Tablet Take 1 tablet daily      Allergies  Medication    · No Known Drug Allergies  Recorded By: Jarrett Ayala; 6/14/2016 10:30:06 AM     Family History  Father    · Family history of coronary artery disease (V17.3) (Z82.49)     Social History  Problems    · Never a smoker     Vitals  Vital Signs     Recorded: [id number] 11:21AM   Heart Rate 69   Respiration 17   Systolic 126   Diastolic 68   Height 5 ft 5 in   Weight 180 lb 12.42 oz   BMI Calculated 30.08 kg/m2                  Physical Exam     On Examination he was comfortably sitting. HEENT: Normal. No pallor. Neck: Supple. Carotids brisk upstroke. Jugular venous pressure was normal. Cardiovascular system examination was essentially unremarkable with a normal first and second heart sound without any significant murmurs or rubs. Chest was clear to auscultation. Abdomen was soft, nontender. No organomegaly. Extremities were without any edema. Peripheral pulses being normally felt. There was no calf tenderness. CNS: HMF normal. No focal deficits.        Results/Data        All available data was personally reviewed by me.     ECHO 1/21/2022  Preserved LV/RV systolic function.     6MWT  1/21/2022  435 M. No drop in O2 sat     CATH 9/13/2012  Baseline Pre PCI LVEF  SVC MEAN  6 (mmHg)    RA MEAN  6 (mmHg)    RA a wave  11 (mmHg)    RA v wave  9 (mmHg)    RV SYSTOLIC  77 (mmHg)    RVEDIAS  10 (mmHg)    PA SYSTOLIC  77 (mmHg)    PA DIASTOLIC  25 (mmHg)    PA MEAN  43 (mmHg)    PCW MEAN  6 (mmHg)    PCW a wave  11 (mmHg)    PCW v wave  7 (mmHg)    AORTIC SYSTOLIC  136 (mmHg)    AORTIC DIASTOLIC  94 (mmHg)    AORTIC MEAN  108 (mmHg)       Baseline Basic  Body Surface Area  1.94 m2       Baseline Resistances  SVR_DSC  1871 (dsc-5)    PVR_DSC  635 (dsc-5)       Baseline Oximetry  Right Heart  Main PA  66%     Left Heart  Femoral Artery  96%        Baseline Cardiac Output  Thermal Cardiac Output 4.53 (L/min)    Thermal Cardiac Index 2.34 (L/min/m2)    Spencer Cardiac Output 4.02 (L/min)    Spencer Cardiac Index  2.07 (L/min/m2)       Phase 1 Oximetry  Right Heart  Main PA  66%     Left Heart  Femoral Artery  96%     Angiographic Comments :  Selective RPA angio in AP view showed abrupt tapering of RUL PA branch,  somewhat truncated RML branch, with paucity of vascularity in the middle  lobe distribution and constriction of lower lobe branches.Selective LPA  angios in 15 degree and 30 degree [place] views showed rather smooth  arborization of branches, though with mild paucity of vascularity in the  middle region.The levo phase of the angios showed well filled normal  sized left ventricle with normal contractility and overall EF of  approximately 55%.         Complications:  * The patient tolerated the procedure with no complications.     Conclusions:  * PAH for evaluation.  * Normal right and left heart filling pressures  * Moderately severe PAH with elevated transpulmonary gradient and PVR.  * Angiographically: Major obstructive lesions, mainly involving RPA  branches more centrally.  *  Minor obstructive lesions in LPA branches  * Preserved LV systolic function (EF 50-55%).   * (Coronary angio done on 6/7/2011  showed a right dominant system with  an anomalous origin of LCx from RCA and a widely patent stent in  proximal LAD, without any obstructive lesions)     Recommendations :  * Elective pulmonary thromboendarterectomy. Continue optimal medical  management including oral anticoagulation. Patient is referred to Dr Ange Cohen, [facility] for the  procedure.     As the Attending physician, I was present throughout the entire  procedure and performed or directly supervised all manipulations during  the procedure.     ________________________________________________________________  Electronically signed at 09/13/2012 18:22:45 by: Jarrett Ayala MD         12/22/2023  Hb 9.5   Iron 20  UIBC 411  TIBC 431    Creatinine 1.33  BUN 10  HbA1c 6.4  Cholesterol 115  LDL 52  HDL 40.2    TSH 1.49  Vit D 6  PTH Intact 171.7  C Peptide 2.8        12/22/2023  ECHO   CONCLUSIONS:   1. Left ventricular systolic function is normal with a 60-65% estimated ejection fraction.   2. The right ventricle is normal in size. There is normal right ventricular global systolic function.   3. There is trace tricuspid regurgitation. The right ventricular systolic pressure is unable to be estimated.   4. Normal aortic root.     Thomas Blum MD  Electronically signed on 12/22/2023 at 9:40:43 AM     PFT WNL  6  meters (improved from 1/22/2022)                 Impressions     DIAGNOSES: Pulmonary arterial hypertension. Symptomatically improving with anticoagulation therapy, H/o PE. Mildly dilated right ventricle with mild RV systolic dysfunction (2012) -> Normal RV sized and function (12/22/2023). 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 Preserved LV systolic  function. (Cath 9/13/2012) NYHA class II. sinus rhythm. Euvolemic. Hypertension. Dyslipidemia. Type 2 Diabetes Mellitus (MrH6g=4.4 on 12/22/2023). Hypothyroidism on replacement therapy. Fe deficiency anemia.     I am pleased to see that Artur has been doing well symptomatically, on his current medications. However, he is very likely to deteriorate symptomatically, if he discontinues PDE5 inhibitor, Tadalafi (ADCIRCA). We discussed about his gradually increasing hemoglobin A1c as well as mild obesity. I encouraged him to continue positive lifestyle changes and dietary modifications to lose weight. I also encouraged him to take plenty of oral fluids, especially because his creatinine has been 1.33     I have added iron supplements and vitamin D.  I also recommended an elective gastroenterology evaluation for upper and lower GI scopy, in a timely fashion.        I encouraged him to continue with activities as tolerated, along with medications as prescribed including Tadalafil (Adcirca) and Rivaroxaban (Xarelto). We will continue to follow him with periodically, with a view to intervene in case of any worsening symptoms, PA pressures or RV function. He knows to get in touch with us, in case of any worsening symptoms.        CC: Moy Cohen M.D.   [facility].

## 2023-12-27 ENCOUNTER — APPOINTMENT (OUTPATIENT)
Dept: RESPIRATORY THERAPY | Facility: HOSPITAL | Age: 58
End: 2023-12-27
Payer: COMMERCIAL

## 2023-12-27 ENCOUNTER — TELEPHONE (OUTPATIENT)
Dept: CARDIOLOGY | Facility: HOSPITAL | Age: 58
End: 2023-12-27
Payer: COMMERCIAL

## 2023-12-27 DIAGNOSIS — D50.9 IRON DEFICIENCY ANEMIA, UNSPECIFIED IRON DEFICIENCY ANEMIA TYPE: Primary | ICD-10-CM

## 2023-12-28 LAB
MGC ASCENT PFT - FEV1 - PRE: 2.38
MGC ASCENT PFT - FEV1 - PREDICTED: 2.89
MGC ASCENT PFT - FVC - PRE: 3.24
MGC ASCENT PFT - FVC - PREDICTED: 3.64

## 2023-12-29 RX ORDER — ERGOCALCIFEROL 1.25 MG/1
50000 CAPSULE ORAL
Qty: 6 CAPSULE | Refills: 0 | Status: SHIPPED | OUTPATIENT
Start: 2023-12-29 | End: 2024-02-09

## 2023-12-29 RX ORDER — IRON POLYSACCHARIDE COMPLEX 150 MG
150 CAPSULE ORAL 2 TIMES DAILY
Qty: 180 CAPSULE | Refills: 3 | Status: SHIPPED | OUTPATIENT
Start: 2023-12-29 | End: 2024-12-28

## 2023-12-30 LAB
ATRIAL RATE: 70 BPM
P AXIS: 19 DEGREES
P OFFSET: 204 MS
P ONSET: 159 MS
PR INTERVAL: 138 MS
Q ONSET: 228 MS
QRS COUNT: 12 BEATS
QRS DURATION: 70 MS
QT INTERVAL: 372 MS
QTC CALCULATION(BAZETT): 401 MS
QTC FREDERICIA: 391 MS
R AXIS: 20 DEGREES
T AXIS: 53 DEGREES
T OFFSET: 414 MS
VENTRICULAR RATE: 70 BPM

## 2024-07-28 DIAGNOSIS — E55.9 VITAMIN D DEFICIENCY: Primary | ICD-10-CM

## 2024-07-28 RX ORDER — ERGOCALCIFEROL 1.25 MG/1
1250 CAPSULE ORAL
Status: SHIPPED | OUTPATIENT
Start: 2024-07-28 | End: 2025-07-13

## 2024-07-29 DIAGNOSIS — I25.10 CORONARY ARTERY DISEASE INVOLVING NATIVE CORONARY ARTERY OF NATIVE HEART, UNSPECIFIED WHETHER ANGINA PRESENT: ICD-10-CM

## 2024-07-29 DIAGNOSIS — M10.9 GOUT, UNSPECIFIED CAUSE, UNSPECIFIED CHRONICITY, UNSPECIFIED SITE: ICD-10-CM

## 2024-07-29 DIAGNOSIS — N40.0 PROSTATISM: ICD-10-CM

## 2024-07-29 DIAGNOSIS — N18.9 CHRONIC KIDNEY DISEASE, UNSPECIFIED CKD STAGE: ICD-10-CM

## 2024-07-29 DIAGNOSIS — E78.5 HYPERLIPIDEMIA, UNSPECIFIED HYPERLIPIDEMIA TYPE: ICD-10-CM

## 2024-07-29 DIAGNOSIS — I27.20 PULMONARY HTN (MULTI): ICD-10-CM

## 2024-07-29 DIAGNOSIS — D64.9 ANEMIA, UNSPECIFIED TYPE: ICD-10-CM

## 2024-07-29 DIAGNOSIS — I25.2 OLD MI (MYOCARDIAL INFARCTION): ICD-10-CM

## 2024-07-29 DIAGNOSIS — Z98.61 S/P PTCA (PERCUTANEOUS TRANSLUMINAL CORONARY ANGIOPLASTY): ICD-10-CM

## 2024-07-29 DIAGNOSIS — I12.0 BENIGN HYPERTENSIVE KIDNEY DISEASE WITH CHRONIC KIDNEY DISEASE STAGE V OR END STAGE RENAL DISEASE (MULTI): ICD-10-CM

## 2024-07-29 DIAGNOSIS — I10 ESSENTIAL HYPERTENSION: ICD-10-CM

## 2024-07-29 DIAGNOSIS — R06.02 SHORTNESS OF BREATH: ICD-10-CM

## 2024-09-13 DIAGNOSIS — I27.20 PULMONARY HYPERTENSION (MULTI): Primary | ICD-10-CM

## 2024-09-13 RX ORDER — TADALAFIL 20 MG/1
40 TABLET ORAL DAILY
Qty: 10 TABLET | Refills: 11 | Status: SHIPPED | OUTPATIENT
Start: 2024-09-13

## 2024-09-14 ENCOUNTER — TELEPHONE (OUTPATIENT)
Dept: CARDIOLOGY | Facility: HOSPITAL | Age: 59
End: 2024-09-14
Payer: COMMERCIAL

## 2024-09-14 DIAGNOSIS — I27.20 PULMONARY HYPERTENSION (MULTI): Primary | ICD-10-CM

## 2024-09-14 RX ORDER — TADALAFIL 20 MG/1
40 TABLET ORAL DAILY
Qty: 60 TABLET | Refills: 11 | Status: CANCELLED | OUTPATIENT
Start: 2024-09-14 | End: 2024-10-14

## 2024-12-24 ENCOUNTER — TELEPHONE (OUTPATIENT)
Dept: CARDIOLOGY | Facility: CLINIC | Age: 59
End: 2024-12-24
Payer: COMMERCIAL

## 2024-12-24 DIAGNOSIS — E78.5 DYSLIPIDEMIA: ICD-10-CM

## 2025-01-07 DIAGNOSIS — I25.119 CORONARY ARTERY DISEASE INVOLVING NATIVE HEART WITH ANGINA PECTORIS, UNSPECIFIED VESSEL OR LESION TYPE: ICD-10-CM

## 2025-01-07 DIAGNOSIS — I27.20 PULMONARY HYPERTENSION (MULTI): ICD-10-CM

## 2025-01-07 DIAGNOSIS — I25.2 OLD ANTERIOR MYOCARDIAL INFARCTION: ICD-10-CM

## 2025-01-07 DIAGNOSIS — Z98.61 S/P PTCA (PERCUTANEOUS TRANSLUMINAL CORONARY ANGIOPLASTY): ICD-10-CM

## 2025-08-22 DIAGNOSIS — I10 PRIMARY HYPERTENSION: ICD-10-CM

## 2025-08-22 DIAGNOSIS — I25.2 HISTORY OF MYOCARDIAL INFARCTION: Chronic | ICD-10-CM

## 2025-08-22 DIAGNOSIS — I27.20 PULMONARY HYPERTENSION (MULTI): ICD-10-CM

## 2025-08-22 DIAGNOSIS — E66.9 OBESITY, UNSPECIFIED CLASS, UNSPECIFIED OBESITY TYPE, UNSPECIFIED WHETHER SERIOUS COMORBIDITY PRESENT: ICD-10-CM

## 2025-08-22 DIAGNOSIS — I27.24 CTEPH (CHRONIC THROMBOEMBOLIC PULMONARY HYPERTENSION): ICD-10-CM

## 2025-08-22 DIAGNOSIS — Z98.61 S/P PTCA (PERCUTANEOUS TRANSLUMINAL CORONARY ANGIOPLASTY): ICD-10-CM

## 2025-08-22 DIAGNOSIS — E78.5 DYSLIPIDEMIA: ICD-10-CM

## 2025-08-22 DIAGNOSIS — I25.2 OLD ANTERIOR MYOCARDIAL INFARCTION: ICD-10-CM

## 2025-08-22 DIAGNOSIS — M10.9 GOUT, UNSPECIFIED CAUSE, UNSPECIFIED CHRONICITY, UNSPECIFIED SITE: ICD-10-CM

## 2025-08-22 DIAGNOSIS — E03.9 HYPOTHYROIDISM, UNSPECIFIED TYPE: ICD-10-CM

## 2025-08-22 DIAGNOSIS — R06.02 SHORTNESS OF BREATH: ICD-10-CM

## 2025-08-22 DIAGNOSIS — Z86.711 HISTORY OF PULMONARY EMBOLISM: ICD-10-CM

## 2025-08-22 DIAGNOSIS — D64.9 ANEMIA, UNSPECIFIED TYPE: ICD-10-CM

## 2025-08-22 DIAGNOSIS — R94.39 ABNORMAL STRESS TEST: ICD-10-CM

## 2025-08-22 DIAGNOSIS — I25.10 CORONARY ARTERY DISEASE INVOLVING NATIVE CORONARY ARTERY OF NATIVE HEART, UNSPECIFIED WHETHER ANGINA PRESENT: ICD-10-CM

## 2025-08-29 ENCOUNTER — OFFICE VISIT (OUTPATIENT)
Dept: CARDIOLOGY | Facility: HOSPITAL | Age: 60
End: 2025-08-29
Payer: COMMERCIAL

## 2025-08-29 ENCOUNTER — HOSPITAL ENCOUNTER (OUTPATIENT)
Dept: RESPIRATORY THERAPY | Facility: HOSPITAL | Age: 60
Discharge: HOME | End: 2025-08-29
Payer: COMMERCIAL

## 2025-08-29 ENCOUNTER — LAB (OUTPATIENT)
Dept: LAB | Facility: HOSPITAL | Age: 60
End: 2025-08-29
Payer: COMMERCIAL

## 2025-08-29 ENCOUNTER — HOSPITAL ENCOUNTER (OUTPATIENT)
Dept: CARDIOLOGY | Facility: HOSPITAL | Age: 60
Discharge: HOME | End: 2025-08-29
Payer: COMMERCIAL

## 2025-08-29 DIAGNOSIS — E78.5 DYSLIPIDEMIA: ICD-10-CM

## 2025-08-29 DIAGNOSIS — I25.10 CORONARY ARTERY DISEASE INVOLVING NATIVE CORONARY ARTERY OF NATIVE HEART, UNSPECIFIED WHETHER ANGINA PRESENT: ICD-10-CM

## 2025-08-29 DIAGNOSIS — R06.02 SHORTNESS OF BREATH: ICD-10-CM

## 2025-08-29 DIAGNOSIS — I25.2 HISTORY OF MYOCARDIAL INFARCTION: Chronic | ICD-10-CM

## 2025-08-29 DIAGNOSIS — E03.9 HYPOTHYROIDISM, UNSPECIFIED TYPE: ICD-10-CM

## 2025-08-29 DIAGNOSIS — Z98.61 S/P PTCA (PERCUTANEOUS TRANSLUMINAL CORONARY ANGIOPLASTY): ICD-10-CM

## 2025-08-29 DIAGNOSIS — I27.24 CTEPH (CHRONIC THROMBOEMBOLIC PULMONARY HYPERTENSION): ICD-10-CM

## 2025-08-29 DIAGNOSIS — Z86.711 HISTORY OF PULMONARY EMBOLISM: ICD-10-CM

## 2025-08-29 DIAGNOSIS — I10 PRIMARY HYPERTENSION: ICD-10-CM

## 2025-08-29 DIAGNOSIS — D64.9 ANEMIA, UNSPECIFIED TYPE: ICD-10-CM

## 2025-08-29 DIAGNOSIS — I27.20 PULMONARY HYPERTENSION (MULTI): ICD-10-CM

## 2025-08-29 DIAGNOSIS — M10.9 GOUT, UNSPECIFIED CAUSE, UNSPECIFIED CHRONICITY, UNSPECIFIED SITE: ICD-10-CM

## 2025-08-29 DIAGNOSIS — E66.9 OBESITY, UNSPECIFIED CLASS, UNSPECIFIED OBESITY TYPE, UNSPECIFIED WHETHER SERIOUS COMORBIDITY PRESENT: ICD-10-CM

## 2025-08-29 DIAGNOSIS — R94.39 ABNORMAL STRESS TEST: ICD-10-CM

## 2025-08-29 DIAGNOSIS — I25.2 OLD ANTERIOR MYOCARDIAL INFARCTION: ICD-10-CM

## 2025-08-29 DIAGNOSIS — I25.10 CORONARY ARTERY DISEASE INVOLVING NATIVE CORONARY ARTERY OF NATIVE HEART, UNSPECIFIED WHETHER ANGINA PRESENT: Primary | ICD-10-CM

## 2025-08-29 LAB
AORTIC VALVE MEAN GRADIENT: 3 MMHG
AORTIC VALVE PEAK VELOCITY: 1.19 M/S
AV PEAK GRADIENT: 6 MMHG
AVA (PEAK VEL): 2.6 CM2
AVA (VTI): 2.44 CM2
EJECTION FRACTION APICAL 4 CHAMBER: 54.3
EJECTION FRACTION: 63 %
LEFT ATRIUM VOLUME AREA LENGTH INDEX BSA: 23.7 ML/M2
LEFT VENTRICLE INTERNAL DIMENSION DIASTOLE: 4.39 CM (ref 3.5–6)
LEFT VENTRICULAR OUTFLOW TRACT DIAMETER: 1.96 CM
MITRAL VALVE E/A RATIO: 0.9
RIGHT VENTRICLE FREE WALL PEAK S': 11 CM/S
TRICUSPID ANNULAR PLANE SYSTOLIC EXCURSION: 2.2 CM

## 2025-08-29 PROCEDURE — 99211 OFF/OP EST MAY X REQ PHY/QHP: CPT | Mod: 25

## 2025-08-29 PROCEDURE — 93306 TTE W/DOPPLER COMPLETE: CPT

## 2025-08-29 PROCEDURE — 99215 OFFICE O/P EST HI 40 MIN: CPT | Performed by: INTERNAL MEDICINE

## 2025-08-29 PROCEDURE — 94618 PULMONARY STRESS TESTING: CPT

## 2025-08-29 PROCEDURE — 93005 ELECTROCARDIOGRAM TRACING: CPT | Performed by: INTERNAL MEDICINE

## 2025-08-29 PROCEDURE — 93306 TTE W/DOPPLER COMPLETE: CPT | Performed by: INTERNAL MEDICINE

## 2025-09-04 LAB
ATRIAL RATE: 56 BPM
P AXIS: 15 DEGREES
P OFFSET: 198 MS
P ONSET: 152 MS
PR INTERVAL: 130 MS
Q ONSET: 217 MS
QRS COUNT: 10 BEATS
QRS DURATION: 72 MS
QT INTERVAL: 390 MS
QTC CALCULATION(BAZETT): 376 MS
QTC FREDERICIA: 381 MS
R AXIS: 14 DEGREES
T AXIS: 53 DEGREES
T OFFSET: 412 MS
VENTRICULAR RATE: 56 BPM